# Patient Record
Sex: MALE | Race: WHITE | NOT HISPANIC OR LATINO | URBAN - METROPOLITAN AREA
[De-identification: names, ages, dates, MRNs, and addresses within clinical notes are randomized per-mention and may not be internally consistent; named-entity substitution may affect disease eponyms.]

---

## 2018-08-16 ENCOUNTER — OUTPATIENT (OUTPATIENT)
Dept: OUTPATIENT SERVICES | Facility: HOSPITAL | Age: 78
LOS: 1 days | Discharge: HOME | End: 2018-08-16

## 2018-08-16 LAB — GLUCOSE BLDC GLUCOMTR-MCNC: 75 MG/DL — SIGNIFICANT CHANGE UP (ref 70–99)

## 2019-07-30 PROBLEM — Z00.00 ENCOUNTER FOR PREVENTIVE HEALTH EXAMINATION: Status: ACTIVE | Noted: 2019-07-30

## 2019-08-02 ENCOUNTER — APPOINTMENT (OUTPATIENT)
Dept: OTOLARYNGOLOGY | Facility: CLINIC | Age: 79
End: 2019-08-02
Payer: MEDICARE

## 2019-08-02 VITALS
HEART RATE: 73 BPM | HEIGHT: 63 IN | DIASTOLIC BLOOD PRESSURE: 83 MMHG | BODY MASS INDEX: 28 KG/M2 | SYSTOLIC BLOOD PRESSURE: 145 MMHG | WEIGHT: 158 LBS

## 2019-08-02 DIAGNOSIS — Z86.39 PERSONAL HISTORY OF OTHER ENDOCRINE, NUTRITIONAL AND METABOLIC DISEASE: ICD-10-CM

## 2019-08-02 DIAGNOSIS — Z78.9 OTHER SPECIFIED HEALTH STATUS: ICD-10-CM

## 2019-08-02 DIAGNOSIS — Z87.891 PERSONAL HISTORY OF NICOTINE DEPENDENCE: ICD-10-CM

## 2019-08-02 PROCEDURE — 99204 OFFICE O/P NEW MOD 45 MIN: CPT | Mod: 25

## 2019-08-02 PROCEDURE — 31237 NSL/SINS NDSC SURG BX POLYPC: CPT

## 2019-08-05 PROBLEM — Z86.39 HISTORY OF DIABETES MELLITUS: Status: RESOLVED | Noted: 2019-08-02 | Resolved: 2019-08-05

## 2019-08-05 PROBLEM — Z86.39 HISTORY OF HIGH CHOLESTEROL: Status: RESOLVED | Noted: 2019-08-02 | Resolved: 2019-08-05

## 2019-08-05 PROBLEM — Z87.891 FORMER SMOKER: Status: ACTIVE | Noted: 2019-08-02

## 2019-08-05 PROBLEM — Z78.9 NO PERTINENT PAST MEDICAL HISTORY: Status: RESOLVED | Noted: 2019-08-02 | Resolved: 2019-08-05

## 2019-08-05 PROBLEM — Z78.9 CONSUMES ALCOHOL OCCASIONALLY: Status: ACTIVE | Noted: 2019-08-02

## 2019-08-05 NOTE — HISTORY OF PRESENT ILLNESS
[de-identified] : 78M here for initial evaluation.\par \par Over the past few weeks, he c/o worsening nasal congestion/obstruction with poor sleep quality. However, his sx have started to improve, but he is here for evaluation.\par There is no headache or clear rhinorrhea, no facial pressure or pain.\par He otherwise feels well today.\par \par He underwent endoscopic resection of pituitary tumor in 2014 in [place], but does not know anything further regarding that. \par \par ROS otherwise unremarkable.

## 2019-08-05 NOTE — PROCEDURE
[FreeTextEntry3] : Nasal Endoscopy:\par abundant thick green crusting, removed\par very large/subtotal septal perforation, areas of scarring of remant septum to right MT; scarring of left MT to lateral wall\par b/l sphenoid osti patent, sinus patent, no drainage

## 2019-08-05 NOTE — CONSULT LETTER
[Dear  ___] : Dear  [unfilled], [Courtesy Letter:] : I had the pleasure of seeing your patient, [unfilled], in my office today. [Consult Closing:] : Thank you very much for allowing me to participate in the care of this patient.  If you have any questions, please do not hesitate to contact me. [Sincerely,] : Sincerely, [FreeTextEntry3] : Cain Elise MD\par Department of Otolaryngology - Head and Neck Surgery\par [facility]

## 2019-08-05 NOTE — ASSESSMENT
[FreeTextEntry1] : 78M here for initial evaluation. Over the past few weeks, he c/o worsening nasal congestion/obstruction with poor sleep quality. However, his sx have started to improve, but he is here for evaluation either way to touch base. There is no headache or clear rhinorrhea, no facial pressure or pain. He otherwise feels well today.\par He underwent endoscopic resection of a pituitary tumor in 2014 in [place], but does not know anything further regarding that.\par On exam, nasal endoscopy shows abundant thick green crusting, which was removed. 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 He is otherwise asymptomatic today, but I do not know much about his prior surgery. Will start w getting CT sinus as next step. He will likely also need MRI to assess the central skull base as well. to start daily neilmed rinses for nasal hygiene. RTO after CT.

## 2019-08-12 ENCOUNTER — OUTPATIENT (OUTPATIENT)
Dept: OUTPATIENT SERVICES | Facility: HOSPITAL | Age: 79
LOS: 1 days | End: 2019-08-12

## 2019-08-12 ENCOUNTER — APPOINTMENT (OUTPATIENT)
Dept: CT IMAGING | Facility: CLINIC | Age: 79
End: 2019-08-12
Payer: MEDICARE

## 2019-08-12 PROCEDURE — 70486 CT MAXILLOFACIAL W/O DYE: CPT | Mod: 26

## 2019-09-03 ENCOUNTER — APPOINTMENT (OUTPATIENT)
Dept: OTOLARYNGOLOGY | Facility: CLINIC | Age: 79
End: 2019-09-03
Payer: MEDICARE

## 2019-09-03 VITALS
BODY MASS INDEX: 28.53 KG/M2 | HEART RATE: 77 BPM | HEIGHT: 63 IN | WEIGHT: 161 LBS | SYSTOLIC BLOOD PRESSURE: 130 MMHG | DIASTOLIC BLOOD PRESSURE: 80 MMHG

## 2019-09-03 PROCEDURE — 31231 NASAL ENDOSCOPY DX: CPT

## 2019-09-03 PROCEDURE — 99213 OFFICE O/P EST LOW 20 MIN: CPT | Mod: 25

## 2019-09-03 NOTE — DATA REVIEWED
[No studies available for review at this time] : No studies available for review at this time [de-identified] : CT Sinus 8/12/19:\par Findings: \par \par * Prior Surgery: There is large perforation of the nasal septum, 3.1 cm in \par anterior posterior length. The left middle turbinate vertical short is \par absent which may be postsurgical, however the uncinate processes remain in \par situ without evidence of antrostomy. There may be minimal ethmoidectomy on \par the left. Defect of the left medial orbital wall is felt to be posttraumatic. \par \par * Sinuses: The right sphenoid sinus is partially opacified with \par heterogeneous content with small foci of internal ossification. There is \par hyperostosis of the walls of the right sphenoid sinus, inclusive of the \par sellar floor, that implies chronicity. The soft tissue is immediately \par adjacent to a defect in the anterior wall of the right sphenoid sinus \par (series 3, image 49; series 604, image 39), also in close proximity to the \par posterior septal insertion. There is somewhat strand-like configuration of \par ossification of the floor of the sphenoid sinus (coronal image 52) somewhat \par reminiscent of inverted papilloma. The left sphenoid sinus and both sphenoid \par ostia are patent. \par \par * Scant mucosal thickening is seen in scattered ethmoid air cells and in \par the maxillary sinuses without fluid level. \par \par * Sinocranial and Sino-orbital Junctions: Left medial orbital wall defect \par with fat herniating into the ethmoid air cells. The orbital floors appear \par intact. The central skull base appears intact as well with preserved bony \par carotid and optic canals. \par \par * Ostiomeatal Units: Patent, with somewhat horizontal configuration of the \par right uncinate process from low-lying ethmoid air cells. \par \par * Frontal Sinus Outflow Tracts: Patent although crowded in the presence of \par variant frontoethmoidal air cells. \par \par * Sphenoethmoidal Recesses: No masslike soft tissue or other asymmetry \par \par * Nasal Cavity/Nasopharynx: No discrete soft tissue mass. Large nasal \par septal perforation, as above, with some residual septum bowing to the right \par and contacting the right middle turbinate. \par \par * Orbits: No abnormal soft tissue or mass effect. The ocular lenses have \par been replaced \par \par * Brain: Limited assessment of the intracranial compartment shows \par age-appropriate mild parenchymal volume loss and periventricular white \par matter small vessel ischemic lucency. No hydrocephalus or mass effect. \par \par * Mastoids: Well aerated. \par \par \par IMPRESSION: \par \par Large nasal septal perforation, likely stigmata of granulomatous disease or \par toxic exposure, with additional defect in the left middle turbinate strut \par that may be postoperative. Both maxillary outflows are patent with the \par uncinates in situ. \par \par Opacified right sphenoid sinus with partially-calcified content. There is \par adjacent defect in the anterior wall of the right sphenoid sinus for which \par further evaluation is recommended. While this is likely chronic and \par inflammatory (possibly fungal) in nature, further evaluation with direct \par inspection and contrast-enhanced MRI of the face is suggested to better \par exclude a mass, i.e. inverted papilloma. \par \par Remote left medial orbital wall fracture with ethmoid herniation of orbital \par fat. \par

## 2019-09-03 NOTE — PROCEDURE
[FreeTextEntry3] : Nasal Endoscopy:\par very large/subtotal septal perforation, areas of scarring of remant septum to right MT; scarring of left MT to lateral wall\par b/l sphenoid osti patent, but very contracted, cannot see into either sinus, no drainage\par no tumor or mass seen

## 2019-09-03 NOTE — HISTORY OF PRESENT ILLNESS
[de-identified] : 78M here in followup.\par \par There are no new issues since last seen. He is here to review CT results.\par \par Initially seen for worsening nasal congestion/obstruction with poor sleep quality. 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 They do not have the records with them.\par \par CT Sinus 8/12/19:\par -Large nasal septal perforation, with additional defect in the left middle turbinate strut.\par -Opacified right sphenoid sinus with partially-calcified content. \par \par ROS otherwise unremarkable.

## 2019-09-03 NOTE — ASSESSMENT
[FreeTextEntry1] : 78M here in followup. There are no new issues since last seen 6 weeks ago and he is here to review CT results.\par Initially seen for worsening nasal congestion/obstruction with poor sleep quality, which have improved. There is no headache or clear rhinorrhea, no facial pressure or pain. 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 There is a very large septal perforation with areas of scarring to surrounding tissues; both sphenoid ostia are patent without drainage but contracted and I cannot see into either sinus.\par He has large septal perforation and extensive postsurgical changes after some form of aggressive endonasal surgery 5 years ago. I cannot even determine if this was pituitary surgery given the appearance of the sella on CT. I do not know much about his prior surgery and do not have records.\par Will proceed with MRI to assess the central skull base. They will get me his old records. Continue daily neilmed rinses. RTO 1 month to review and formulate treatment plan.

## 2019-09-03 NOTE — CONSULT LETTER
[Dear  ___] : Dear  [unfilled], [Consult Closing:] : Thank you very much for allowing me to participate in the care of this patient.  If you have any questions, please do not hesitate to contact me. [Courtesy Letter:] : I had the pleasure of seeing your patient, [unfilled], in my office today. [Sincerely,] : Sincerely, [FreeTextEntry3] : Cain Elise MD\par Department of Otolaryngology - Head and Neck Surgery\par [facility]

## 2019-09-24 ENCOUNTER — FORM ENCOUNTER (OUTPATIENT)
Age: 79
End: 2019-09-24

## 2019-09-25 ENCOUNTER — OUTPATIENT (OUTPATIENT)
Dept: OUTPATIENT SERVICES | Facility: HOSPITAL | Age: 79
LOS: 1 days | End: 2019-09-25
Payer: MEDICARE

## 2019-09-25 ENCOUNTER — APPOINTMENT (OUTPATIENT)
Dept: MRI IMAGING | Facility: HOSPITAL | Age: 79
End: 2019-09-25
Payer: MEDICARE

## 2019-09-25 PROCEDURE — 70553 MRI BRAIN STEM W/O & W/DYE: CPT | Mod: 26

## 2019-09-25 PROCEDURE — 70553 MRI BRAIN STEM W/O & W/DYE: CPT

## 2019-09-25 PROCEDURE — A9585: CPT

## 2019-10-31 ENCOUNTER — APPOINTMENT (OUTPATIENT)
Dept: OTOLARYNGOLOGY | Facility: CLINIC | Age: 79
End: 2019-10-31
Payer: MEDICARE

## 2019-10-31 VITALS
BODY MASS INDEX: 26.2 KG/M2 | WEIGHT: 163 LBS | HEART RATE: 66 BPM | HEIGHT: 66 IN | DIASTOLIC BLOOD PRESSURE: 84 MMHG | SYSTOLIC BLOOD PRESSURE: 169 MMHG

## 2019-10-31 PROCEDURE — 99213 OFFICE O/P EST LOW 20 MIN: CPT | Mod: 25

## 2019-10-31 PROCEDURE — 31231 NASAL ENDOSCOPY DX: CPT

## 2019-10-31 NOTE — CONSULT LETTER
[Dear  ___] : Dear  [unfilled], [Courtesy Letter:] : I had the pleasure of seeing your patient, [unfilled], in my office today. [Consult Closing:] : Thank you very much for allowing me to participate in the care of this patient.  If you have any questions, please do not hesitate to contact me. [Sincerely,] : Sincerely, [FreeTextEntry3] : Cain Elise MD\par Department of Otolaryngology - Head and Neck Surgery\par [facility]

## 2019-10-31 NOTE — ASSESSMENT
[FreeTextEntry1] : 78M here in followup. There are no new issues since last seen 6 weeks ago and he is here to review imaging.\par Initially seen for worsening nasal congestion/obstruction with poor sleep quality, which have all resolved. There is no headache or clear rhinorrhea, no facial pressure or pain. 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 MRI shows relative fullness of the left pituitary lobe with a paucity of right adenohypophysial tissue.\par On exam, nasal endoscopy is improved and there is no further thick green crusting or edema. There is a very large septal perforation with areas of scarring to surrounding tissues; both sphenoid ostia are patent without drainage but contracted and I cannot see into either sinus.\par He has large septal perforation and extensive postsurgical changes after some form of aggressive endonasal surgery 5 years ago. I do not know much about his prior surgery and do not have records. However, based on the most recent MRI, as detailed above, there is likely nothing further to be done, especially since he feels well at this time and prior sx have resolved. I again reiterated to them to get me his old records. Continue daily neilmed rinses. RTO with old records.

## 2019-10-31 NOTE — DATA REVIEWED
[No studies available for review at this time] : No studies available for review at this time [de-identified] : CT Sinus 8/12/19:\par Findings: \par \par * Prior Surgery: There is large perforation of the nasal septum, 3.1 cm in \par anterior posterior length. The left middle turbinate vertical short is \par absent which may be postsurgical, however the uncinate processes remain in \par situ without evidence of antrostomy. There may be minimal ethmoidectomy on \par the left. Defect of the left medial orbital wall is felt to be posttraumatic. \par \par * Sinuses: The right sphenoid sinus is partially opacified with \par heterogeneous content with small foci of internal ossification. There is \par hyperostosis of the walls of the right sphenoid sinus, inclusive of the \par sellar floor, that implies chronicity. The soft tissue is immediately \par adjacent to a defect in the anterior wall of the right sphenoid sinus \par (series 3, image 49; series 604, image 39), also in close proximity to the \par posterior septal insertion. There is somewhat strand-like configuration of \par ossification of the floor of the sphenoid sinus (coronal image 52) somewhat \par reminiscent of inverted papilloma. The left sphenoid sinus and both sphenoid \par ostia are patent. \par \par * Scant mucosal thickening is seen in scattered ethmoid air cells and in \par the maxillary sinuses without fluid level. \par \par * Sinocranial and Sino-orbital Junctions: Left medial orbital wall defect \par with fat herniating into the ethmoid air cells. The orbital floors appear \par intact. The central skull base appears intact as well with preserved bony \par carotid and optic canals. \par \par * Ostiomeatal Units: Patent, with somewhat horizontal configuration of the \par right uncinate process from low-lying ethmoid air cells. \par \par * Frontal Sinus Outflow Tracts: Patent although crowded in the presence of \par variant frontoethmoidal air cells. \par \par * Sphenoethmoidal Recesses: No masslike soft tissue or other asymmetry \par \par * Nasal Cavity/Nasopharynx: No discrete soft tissue mass. Large nasal \par septal perforation, as above, with some residual septum bowing to the right \par and contacting the right middle turbinate. \par \par * Orbits: No abnormal soft tissue or mass effect. The ocular lenses have \par been replaced \par \par * Brain: Limited assessment of the intracranial compartment shows \par age-appropriate mild parenchymal volume loss and periventricular white \par matter small vessel ischemic lucency. No hydrocephalus or mass effect. \par \par * Mastoids: Well aerated. \par \par \par IMPRESSION: \par \par Large nasal septal perforation, likely stigmata of granulomatous disease or \par toxic exposure, with additional defect in the left middle turbinate strut \par that may be postoperative. Both maxillary outflows are patent with the \par uncinates in situ. \par \par Opacified right sphenoid sinus with partially-calcified content. There is \par adjacent defect in the anterior wall of the right sphenoid sinus for which \par further evaluation is recommended. While this is likely chronic and \par inflammatory (possibly fungal) in nature, further evaluation with direct \par inspection and contrast-enhanced MRI of the face is suggested to better \par exclude a mass, i.e. inverted papilloma. \par \par Remote left medial orbital wall fracture with ethmoid herniation of orbital \par fat. \par \par There is evidence of prior transseptal transsphenoidal surgery with right \par sphenoid sinus opacification, as described in report of the 8/2019 sinus CT. 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 This finding is well seen on precontrast sagittal T1 series 8 image \par 8. There is an asymmetric configuration to the adenohypophysis with relative \par fullness and diminished enhancement within the left lobe. A demarcated \par lesion measuring 8 mm in maximal dimension is most evident on enhanced \par sagittal T1 image 11 (series 11) as well as on coronal enhanced T1 image 14 \par (series 12). There is, however, a paucity of pituitary tissue in the \par expected location of the right lobe and it is possible that this accounts \par for the appearance of relative left lobe fullness. Ultimately, correlation \par with prior imaging studies and biochemical parameters would be most helpful \par in assessing for potential recurrence. \par \par The optic chiasm and cavernous sinuses are normal in appearance bilaterally. \par There is a relative medial swing to the internal carotid arteries, more so \par on the left side. Limited evaluation of the intracranial contents \par demonstrates mild ventriculomegaly, likely reflecting central cerebral \par volume loss, as well as numerous foci of T2 prolongation in the cerebral \par white matter that most commonly reflect changes of chronic ischemic \par microangiopathy in a patient of this age. \par \par IMPRESSION: \par \par While there is relative fullness of the left pituitary lobe, there is a \par paucity of right adenohypophysial tissue, and significance of this \par appearance is uncertain. Comparison with prior imaging studies would be most \par helpful in assessing for potential recurrence of pituitary adenoma. \par

## 2019-10-31 NOTE — HISTORY OF PRESENT ILLNESS
[de-identified] : 78M here in followup.\par \par There are no new issues since last seen. 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 They do not have the records with them.\par \par CT Sinus 8/12/19:\par -Large nasal septal perforation, with additional defect in the left middle turbinate strut.\par -Opacified right sphenoid sinus with partially-calcified content. \par \par MRI Face 9/25/19:\par While there is relative fullness of the left pituitary lobe, there is a paucity of right adenohypophysial tissue, and significance of this appearance is uncertain. Comparison with prior imaging studies would be most helpful in assessing for potential recurrence of pituitary adenoma. \par \par ROS otherwise unremarkable.

## 2019-10-31 NOTE — PROCEDURE
[FreeTextEntry3] : Nasal Endoscopy:\par very large/subtotal septal perforation, areas of scarring of remnant septum to right MT; scarring of left MT to lateral wall\par b/l sphenoid osti patent, but very contracted, cannot see into either sinus, no drainage\par no tumor or mass seen

## 2021-01-01 ENCOUNTER — APPOINTMENT (OUTPATIENT)
Dept: OTOLARYNGOLOGY | Facility: CLINIC | Age: 81
End: 2021-01-01
Payer: MEDICARE

## 2021-01-01 VITALS
TEMPERATURE: 98.3 F | SYSTOLIC BLOOD PRESSURE: 103 MMHG | HEART RATE: 87 BPM | DIASTOLIC BLOOD PRESSURE: 85 MMHG | WEIGHT: 170 LBS | HEIGHT: 66 IN | BODY MASS INDEX: 27.32 KG/M2

## 2021-01-01 DIAGNOSIS — K11.20 SIALOADENITIS, UNSPECIFIED: ICD-10-CM

## 2021-01-01 PROCEDURE — 99213 OFFICE O/P EST LOW 20 MIN: CPT

## 2021-02-16 ENCOUNTER — APPOINTMENT (OUTPATIENT)
Dept: OTOLARYNGOLOGY | Facility: CLINIC | Age: 81
End: 2021-02-16
Payer: MEDICARE

## 2021-02-16 PROCEDURE — 31237 NSL/SINS NDSC SURG BX POLYPC: CPT | Mod: 50

## 2021-02-16 PROCEDURE — 99072 ADDL SUPL MATRL&STAF TM PHE: CPT

## 2021-02-16 PROCEDURE — 99213 OFFICE O/P EST LOW 20 MIN: CPT | Mod: 25

## 2021-02-16 NOTE — DATA REVIEWED
[No studies available for review at this time] : No studies available for review at this time [de-identified] : CT Sinus 8/12/19:\par Findings: \par \par * Prior Surgery: There is large perforation of the nasal septum, 3.1 cm in \par anterior posterior length. The left middle turbinate vertical short is \par absent which may be postsurgical, however the uncinate processes remain in \par situ without evidence of antrostomy. There may be minimal ethmoidectomy on \par the left. Defect of the left medial orbital wall is felt to be posttraumatic. \par \par * Sinuses: The right sphenoid sinus is partially opacified with \par heterogeneous content with small foci of internal ossification. There is \par hyperostosis of the walls of the right sphenoid sinus, inclusive of the \par sellar floor, that implies chronicity. The soft tissue is immediately \par adjacent to a defect in the anterior wall of the right sphenoid sinus \par (series 3, image 49; series 604, image 39), also in close proximity to the \par posterior septal insertion. There is somewhat strand-like configuration of \par ossification of the floor of the sphenoid sinus (coronal image 52) somewhat \par reminiscent of inverted papilloma. The left sphenoid sinus and both sphenoid \par ostia are patent. \par \par * Scant mucosal thickening is seen in scattered ethmoid air cells and in \par the maxillary sinuses without fluid level. \par \par * Sinocranial and Sino-orbital Junctions: Left medial orbital wall defect \par with fat herniating into the ethmoid air cells. The orbital floors appear \par intact. The central skull base appears intact as well with preserved bony \par carotid and optic canals. \par \par * Ostiomeatal Units: Patent, with somewhat horizontal configuration of the \par right uncinate process from low-lying ethmoid air cells. \par \par * Frontal Sinus Outflow Tracts: Patent although crowded in the presence of \par variant frontoethmoidal air cells. \par \par * Sphenoethmoidal Recesses: No masslike soft tissue or other asymmetry \par \par * Nasal Cavity/Nasopharynx: No discrete soft tissue mass. Large nasal \par septal perforation, as above, with some residual septum bowing to the right \par and contacting the right middle turbinate. \par \par * Orbits: No abnormal soft tissue or mass effect. The ocular lenses have \par been replaced \par \par * Brain: Limited assessment of the intracranial compartment shows \par age-appropriate mild parenchymal volume loss and periventricular white \par matter small vessel ischemic lucency. No hydrocephalus or mass effect. \par \par * Mastoids: Well aerated. \par \par \par IMPRESSION: \par \par Large nasal septal perforation, likely stigmata of granulomatous disease or \par toxic exposure, with additional defect in the left middle turbinate strut \par that may be postoperative. Both maxillary outflows are patent with the \par uncinates in situ. \par \par Opacified right sphenoid sinus with partially-calcified content. There is \par adjacent defect in the anterior wall of the right sphenoid sinus for which \par further evaluation is recommended. While this is likely chronic and \par inflammatory (possibly fungal) in nature, further evaluation with direct \par inspection and contrast-enhanced MRI of the face is suggested to better \par exclude a mass, i.e. inverted papilloma. \par \par Remote left medial orbital wall fracture with ethmoid herniation of orbital \par fat. \par \par There is evidence of prior transseptal transsphenoidal surgery with right \par sphenoid sinus opacification, as described in report of the 8/2019 sinus CT. 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 This finding is well seen on precontrast sagittal T1 series 8 image \par 8. There is an asymmetric configuration to the adenohypophysis with relative \par fullness and diminished enhancement within the left lobe. A demarcated \par lesion measuring 8 mm in maximal dimension is most evident on enhanced \par sagittal T1 image 11 (series 11) as well as on coronal enhanced T1 image 14 \par (series 12). There is, however, a paucity of pituitary tissue in the \par expected location of the right lobe and it is possible that this accounts \par for the appearance of relative left lobe fullness. Ultimately, correlation \par with prior imaging studies and biochemical parameters would be most helpful \par in assessing for potential recurrence. \par \par The optic chiasm and cavernous sinuses are normal in appearance bilaterally. \par There is a relative medial swing to the internal carotid arteries, more so \par on the left side. Limited evaluation of the intracranial contents \par demonstrates mild ventriculomegaly, likely reflecting central cerebral \par volume loss, as well as numerous foci of T2 prolongation in the cerebral \par white matter that most commonly reflect changes of chronic ischemic \par microangiopathy in a patient of this age. \par \par IMPRESSION: \par \par While there is relative fullness of the left pituitary lobe, there is a \par paucity of right adenohypophysial tissue, and significance of this \par appearance is uncertain. Comparison with prior imaging studies would be most \par helpful in assessing for potential recurrence of pituitary adenoma. \par

## 2021-02-16 NOTE — ASSESSMENT
[FreeTextEntry1] : 80M here in followup. He was last seen 10/2019. He c/o nasal obstruction and congestion and poor sleeping. He has not been using any nasal irrigations. There is no headache or clear rhinorrhea, no facial pressure or pain. 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 CT imaging here in 2019 shows a large nasal septal perforation and an opacified right sphenoid sinus with partially-calcified content and diffusely irregular, thickened and osteitic sphenoid walls; MRI shows relative fullness of the left pituitary lobe with a paucity of right adenohypophysial tissue.\par On exam, nasal endoscopy shows copious amounts of crusting and debris w complete nasal airway obstruction; this was debrided w improvement in sx. There is a very large septal perforation with areas of scarring to surrounding tissues; both sphenoid ostia are patent without drainage but contracted.\par He has large septal perforation and extensive postsurgical changes after some form of aggressive endonasal surgery 7 years ago. I do not know much about his prior surgery and do not have records. Given the nature of surgery, he needs to be on strict nasal regimen and hygiene given the fact he will buildup crusting. Based on the most recent MRI, as detailed above, there is likely nothing further to be done surgically. Continue daily neilmed rinses. RTO in 6 months w with old records.

## 2021-02-16 NOTE — PROCEDURE
[FreeTextEntry3] : Nasal Endoscopy:\par severe amount of crusting and mucoid debris debrided w suction and forceps\par very large/subtotal septal perforation, areas of scarring of remnant septum to right MT; scarring of left MT to lateral wall\par b/l sphenoid osti patent, but very contracted, cannot see into either sinus\par no tumor or mass seen

## 2021-02-16 NOTE — HISTORY OF PRESENT ILLNESS
[de-identified] : 80M here in followup.\par \par He was last seen 10/2019.\par He c/o nasal obstruction and congestion and poor sleeping. He has not been using any nasal irrigations. 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 They do not have the records with them.\par \par CT Sinus 8/12/19:\par -Large nasal septal perforation, with additional defect in the left middle turbinate strut.\par -Opacified right sphenoid sinus with partially-calcified content. \par \par MRI Face 9/25/19:\par While there is relative fullness of the left pituitary lobe, there is a paucity of right adenohypophysial tissue, and significance of this appearance is uncertain. Comparison with prior imaging studies would be most helpful in assessing for potential recurrence of pituitary adenoma. \par \par ROS otherwise unremarkable.

## 2021-02-16 NOTE — CONSULT LETTER
[Dear  ___] : Dear  [unfilled], [Courtesy Letter:] : I had the pleasure of seeing your patient, [unfilled], in my office today. [Consult Closing:] : Thank you very much for allowing me to participate in the care of this patient.  If you have any questions, please do not hesitate to contact me. [Sincerely,] : Sincerely, [FreeTextEntry3] : Cain Elise MD\par Department of Otolaryngology - Head and Neck Surgery\par [facility]

## 2021-08-17 ENCOUNTER — APPOINTMENT (OUTPATIENT)
Dept: OTOLARYNGOLOGY | Facility: CLINIC | Age: 81
End: 2021-08-17
Payer: MEDICARE

## 2021-08-17 VITALS
WEIGHT: 163 LBS | HEIGHT: 66 IN | BODY MASS INDEX: 26.2 KG/M2 | SYSTOLIC BLOOD PRESSURE: 149 MMHG | DIASTOLIC BLOOD PRESSURE: 80 MMHG | TEMPERATURE: 97.1 F | HEART RATE: 71 BPM

## 2021-08-17 PROCEDURE — 31231 NASAL ENDOSCOPY DX: CPT

## 2021-08-17 PROCEDURE — 99213 OFFICE O/P EST LOW 20 MIN: CPT | Mod: 25

## 2021-08-17 NOTE — PROCEDURE
[FreeTextEntry3] : Nasal Endoscopy:\par nasal airways widely patent\par no crusting or mucoid debris\par large septal perforation, areas of scarring of remnant septum to right MT; scarring of left MT to lateral wall\par b/l sphenoid osti patent, but very contracted, cannot see into either sinus\par no tumor or mass seen

## 2021-08-17 NOTE — HISTORY OF PRESENT ILLNESS
[de-identified] : 80M here in followup.\par \par He was last seen 2/2021.\par Since then, he doing very well. 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 They do not have the records with them.\par \par CT Sinus 8/12/19:\par -Large nasal septal perforation, with additional defect in the left middle turbinate strut.\par -Opacified right sphenoid sinus with partially-calcified content. \par \par MRI Face 9/25/19:\par While there is relative fullness of the left pituitary lobe, there is a paucity of right adenohypophysial tissue, and significance of this appearance is uncertain. Comparison with prior imaging studies would be most helpful in assessing for potential recurrence of pituitary adenoma. \par \par ROS otherwise unremarkable.

## 2021-08-17 NOTE — CONSULT LETTER
[Dear  ___] : Dear  [unfilled], [Courtesy Letter:] : I had the pleasure of seeing your patient, [unfilled], in my office today. [Consult Closing:] : Thank you very much for allowing me to participate in the care of this patient.  If you have any questions, please do not hesitate to contact me. [Sincerely,] : Sincerely, [FreeTextEntry3] : Cain Elise MD\par Department of Otolaryngology - Head and Neck Surgery\par [facility]

## 2021-08-17 NOTE — DATA REVIEWED
[No studies available for review at this time] : No studies available for review at this time [de-identified] : CT Sinus 8/12/19:\par Findings: \par \par * Prior Surgery: There is large perforation of the nasal septum, 3.1 cm in \par anterior posterior length. The left middle turbinate vertical short is \par absent which may be postsurgical, however the uncinate processes remain in \par situ without evidence of antrostomy. There may be minimal ethmoidectomy on \par the left. Defect of the left medial orbital wall is felt to be posttraumatic. \par \par * Sinuses: The right sphenoid sinus is partially opacified with \par heterogeneous content with small foci of internal ossification. There is \par hyperostosis of the walls of the right sphenoid sinus, inclusive of the \par sellar floor, that implies chronicity. The soft tissue is immediately \par adjacent to a defect in the anterior wall of the right sphenoid sinus \par (series 3, image 49; series 604, image 39), also in close proximity to the \par posterior septal insertion. There is somewhat strand-like configuration of \par ossification of the floor of the sphenoid sinus (coronal image 52) somewhat \par reminiscent of inverted papilloma. The left sphenoid sinus and both sphenoid \par ostia are patent. \par \par * Scant mucosal thickening is seen in scattered ethmoid air cells and in \par the maxillary sinuses without fluid level. \par \par * Sinocranial and Sino-orbital Junctions: Left medial orbital wall defect \par with fat herniating into the ethmoid air cells. The orbital floors appear \par intact. The central skull base appears intact as well with preserved bony \par carotid and optic canals. \par \par * Ostiomeatal Units: Patent, with somewhat horizontal configuration of the \par right uncinate process from low-lying ethmoid air cells. \par \par * Frontal Sinus Outflow Tracts: Patent although crowded in the presence of \par variant frontoethmoidal air cells. \par \par * Sphenoethmoidal Recesses: No masslike soft tissue or other asymmetry \par \par * Nasal Cavity/Nasopharynx: No discrete soft tissue mass. Large nasal \par septal perforation, as above, with some residual septum bowing to the right \par and contacting the right middle turbinate. \par \par * Orbits: No abnormal soft tissue or mass effect. The ocular lenses have \par been replaced \par \par * Brain: Limited assessment of the intracranial compartment shows \par age-appropriate mild parenchymal volume loss and periventricular white \par matter small vessel ischemic lucency. No hydrocephalus or mass effect. \par \par * Mastoids: Well aerated. \par \par \par IMPRESSION: \par \par Large nasal septal perforation, likely stigmata of granulomatous disease or \par toxic exposure, with additional defect in the left middle turbinate strut \par that may be postoperative. Both maxillary outflows are patent with the \par uncinates in situ. \par \par Opacified right sphenoid sinus with partially-calcified content. There is \par adjacent defect in the anterior wall of the right sphenoid sinus for which \par further evaluation is recommended. While this is likely chronic and \par inflammatory (possibly fungal) in nature, further evaluation with direct \par inspection and contrast-enhanced MRI of the face is suggested to better \par exclude a mass, i.e. inverted papilloma. \par \par Remote left medial orbital wall fracture with ethmoid herniation of orbital \par fat. \par \par There is evidence of prior transseptal transsphenoidal surgery with right \par sphenoid sinus opacification, as described in report of the 8/2019 sinus CT. 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 This finding is well seen on precontrast sagittal T1 series 8 image \par 8. There is an asymmetric configuration to the adenohypophysis with relative \par fullness and diminished enhancement within the left lobe. A demarcated \par lesion measuring 8 mm in maximal dimension is most evident on enhanced \par sagittal T1 image 11 (series 11) as well as on coronal enhanced T1 image 14 \par (series 12). There is, however, a paucity of pituitary tissue in the \par expected location of the right lobe and it is possible that this accounts \par for the appearance of relative left lobe fullness. Ultimately, correlation \par with prior imaging studies and biochemical parameters would be most helpful \par in assessing for potential recurrence. \par \par The optic chiasm and cavernous sinuses are normal in appearance bilaterally. \par There is a relative medial swing to the internal carotid arteries, more so \par on the left side. Limited evaluation of the intracranial contents \par demonstrates mild ventriculomegaly, likely reflecting central cerebral \par volume loss, as well as numerous foci of T2 prolongation in the cerebral \par white matter that most commonly reflect changes of chronic ischemic \par microangiopathy in a patient of this age. \par \par IMPRESSION: \par \par While there is relative fullness of the left pituitary lobe, there is a \par paucity of right adenohypophysial tissue, and significance of this \par appearance is uncertain. Comparison with prior imaging studies would be most \par helpful in assessing for potential recurrence of pituitary adenoma. \par

## 2021-08-17 NOTE — ASSESSMENT
[FreeTextEntry1] : 80M here in followup. He was last seen 2/2021. Since then, he doing very well. He is using the saline rinses and there is no further nasal obstruction/congestion and  sleep quality is good. 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 I do not know much about his prior surgery and do not have records. Given the nature of surgery, he needs to be on strict nasal regimen and hygiene given the fact he will buildup crusting, which he is doing which is why he doing much better. Based on the most recent MRI, as detailed above, there is likely nothing further to be done surgically. Continue daily neilmed rinses. RTO in 12 months w with old records.

## 2021-11-02 ENCOUNTER — APPOINTMENT (OUTPATIENT)
Dept: OTOLARYNGOLOGY | Facility: CLINIC | Age: 81
End: 2021-11-02
Payer: MEDICARE

## 2021-11-02 VITALS
DIASTOLIC BLOOD PRESSURE: 120 MMHG | SYSTOLIC BLOOD PRESSURE: 200 MMHG | WEIGHT: 163 LBS | BODY MASS INDEX: 26.2 KG/M2 | TEMPERATURE: 98.7 F | HEIGHT: 66 IN

## 2021-11-02 PROCEDURE — 31231 NASAL ENDOSCOPY DX: CPT

## 2021-11-02 PROCEDURE — 99213 OFFICE O/P EST LOW 20 MIN: CPT | Mod: 25

## 2021-11-02 RX ORDER — AMOXICILLIN AND CLAVULANATE POTASSIUM 875; 125 MG/1; MG/1
875-125 TABLET, COATED ORAL
Qty: 20 | Refills: 0 | Status: ACTIVE | COMMUNITY
Start: 2021-11-02 | End: 1900-01-01

## 2021-11-02 NOTE — HISTORY OF PRESENT ILLNESS
[de-identified] : 80M here in followup.\par \par Over the past 2-3 weeks, he c/o left ear pain and swelling in front of his left ear. 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 They do not have the records with them.\par \par CT Sinus 8/12/19:\par -Large nasal septal perforation, with additional defect in the left middle turbinate strut.\par -Opacified right sphenoid sinus with partially-calcified content. \par \par MRI Face 9/25/19:\par While there is relative fullness of the left pituitary lobe, there is a paucity of right adenohypophysial tissue, and significance of this appearance is uncertain. Comparison with prior imaging studies would be most helpful in assessing for potential recurrence of pituitary adenoma. \par \par ROS otherwise unremarkable.

## 2021-11-02 NOTE — PROCEDURE
[FreeTextEntry3] : Nasal Endoscopy:\par nasal airways widely patent\par scant crusting or mucoid debris\par large septal perforation, areas of scarring of remnant septum to right MT; scarring of left MT to lateral wall\par b/l sphenoid osti patent, but very contracted, cannot see into either sinus\par no tumor or mass seen

## 2021-11-02 NOTE — CONSULT LETTER
[Dear  ___] : Dear  [unfilled], [Courtesy Letter:] : I had the pleasure of seeing your patient, [unfilled], in my office today. [Consult Closing:] : Thank you very much for allowing me to participate in the care of this patient.  If you have any questions, please do not hesitate to contact me. [Sincerely,] : Sincerely, [FreeTextEntry3] : Cain Elise MD\par Department of Otolaryngology - Head and Neck Surgery\par [facility]

## 2021-11-02 NOTE — DATA REVIEWED
[No studies available for review at this time] : No studies available for review at this time [de-identified] : CT Sinus 8/12/19:\par Findings: \par \par * Prior Surgery: There is large perforation of the nasal septum, 3.1 cm in \par anterior posterior length. The left middle turbinate vertical short is \par absent which may be postsurgical, however the uncinate processes remain in \par situ without evidence of antrostomy. There may be minimal ethmoidectomy on \par the left. Defect of the left medial orbital wall is felt to be posttraumatic. \par \par * Sinuses: The right sphenoid sinus is partially opacified with \par heterogeneous content with small foci of internal ossification. There is \par hyperostosis of the walls of the right sphenoid sinus, inclusive of the \par sellar floor, that implies chronicity. The soft tissue is immediately \par adjacent to a defect in the anterior wall of the right sphenoid sinus \par (series 3, image 49; series 604, image 39), also in close proximity to the \par posterior septal insertion. There is somewhat strand-like configuration of \par ossification of the floor of the sphenoid sinus (coronal image 52) somewhat \par reminiscent of inverted papilloma. The left sphenoid sinus and both sphenoid \par ostia are patent. \par \par * Scant mucosal thickening is seen in scattered ethmoid air cells and in \par the maxillary sinuses without fluid level. \par \par * Sinocranial and Sino-orbital Junctions: Left medial orbital wall defect \par with fat herniating into the ethmoid air cells. The orbital floors appear \par intact. The central skull base appears intact as well with preserved bony \par carotid and optic canals. \par \par * Ostiomeatal Units: Patent, with somewhat horizontal configuration of the \par right uncinate process from low-lying ethmoid air cells. \par \par * Frontal Sinus Outflow Tracts: Patent although crowded in the presence of \par variant frontoethmoidal air cells. \par \par * Sphenoethmoidal Recesses: No masslike soft tissue or other asymmetry \par \par * Nasal Cavity/Nasopharynx: No discrete soft tissue mass. Large nasal \par septal perforation, as above, with some residual septum bowing to the right \par and contacting the right middle turbinate. \par \par * Orbits: No abnormal soft tissue or mass effect. The ocular lenses have \par been replaced \par \par * Brain: Limited assessment of the intracranial compartment shows \par age-appropriate mild parenchymal volume loss and periventricular white \par matter small vessel ischemic lucency. No hydrocephalus or mass effect. \par \par * Mastoids: Well aerated. \par \par \par IMPRESSION: \par \par Large nasal septal perforation, likely stigmata of granulomatous disease or \par toxic exposure, with additional defect in the left middle turbinate strut \par that may be postoperative. Both maxillary outflows are patent with the \par uncinates in situ. \par \par Opacified right sphenoid sinus with partially-calcified content. There is \par adjacent defect in the anterior wall of the right sphenoid sinus for which \par further evaluation is recommended. While this is likely chronic and \par inflammatory (possibly fungal) in nature, further evaluation with direct \par inspection and contrast-enhanced MRI of the face is suggested to better \par exclude a mass, i.e. inverted papilloma. \par \par Remote left medial orbital wall fracture with ethmoid herniation of orbital \par fat. \par \par There is evidence of prior transseptal transsphenoidal surgery with right \par sphenoid sinus opacification, as described in report of the 8/2019 sinus CT. 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 This finding is well seen on precontrast sagittal T1 series 8 image \par 8. There is an asymmetric configuration to the adenohypophysis with relative \par fullness and diminished enhancement within the left lobe. A demarcated \par lesion measuring 8 mm in maximal dimension is most evident on enhanced \par sagittal T1 image 11 (series 11) as well as on coronal enhanced T1 image 14 \par (series 12). There is, however, a paucity of pituitary tissue in the \par expected location of the right lobe and it is possible that this accounts \par for the appearance of relative left lobe fullness. Ultimately, correlation \par with prior imaging studies and biochemical parameters would be most helpful \par in assessing for potential recurrence. \par \par The optic chiasm and cavernous sinuses are normal in appearance bilaterally. \par There is a relative medial swing to the internal carotid arteries, more so \par on the left side. Limited evaluation of the intracranial contents \par demonstrates mild ventriculomegaly, likely reflecting central cerebral \par volume loss, as well as numerous foci of T2 prolongation in the cerebral \par white matter that most commonly reflect changes of chronic ischemic \par microangiopathy in a patient of this age. \par \par IMPRESSION: \par \par While there is relative fullness of the left pituitary lobe, there is a \par paucity of right adenohypophysial tissue, and significance of this \par appearance is uncertain. Comparison with prior imaging studies would be most \par helpful in assessing for potential recurrence of pituitary adenoma. \par

## 2021-11-02 NOTE — ASSESSMENT
[FreeTextEntry1] : 80M here in followup. Over the past 2-3 weeks, he c/o left ear pain and swelling in front of his left ear. Sx have persisted during this time and he feels have gotten worse. He is using the saline rinses and there is no further nasal obstruction/congestion. There is no headache or clear rhinorrhea.\par On exam, there is pain/tenderness on palpation over the left pretragal parotid region w mild fullness but no discrete mass. The left EAC is normal. Nasal endoscopy remains improved with no further crusting or mucoid debris; there is a very large septal perforation with areas of scarring to surrounding tissues; both sphenoid ostia are patent without drainage but contracted and there is no tumor or mass/lesion.\par I think sx due to left acute parotitis. Will give augmentin, sialogogues, hydration. RTO 3 weeks to ensure improvement. If sx persist/worsen, will image.\par From sinonasal standpoint, sx improved s/p extensive postsurgical changes after some form of aggressive endonasal surgery 7-8 years ago. I do not know much about his prior surgery and do not have records. Given the nature of surgery, he needs to be on strict nasal regimen and hygiene given the fact he will buildup crusting, which he is doing which is why he doing much better.

## 2021-11-02 NOTE — PHYSICAL EXAM
[de-identified] : pain/tenderness on palpation over left parotid w mild fullness but no discrete mass [Nasal Endoscopy Performed] : nasal endoscopy was performed, see procedure section for findings [Midline] : trachea located in midline position [de-identified] : parotid ducts patent [Normal] : no rashes

## 2021-11-23 PROBLEM — K11.20 PAROTITIS: Status: ACTIVE | Noted: 2021-11-02

## 2021-11-23 NOTE — CONSULT LETTER
[Dear  ___] : Dear  [unfilled], [Courtesy Letter:] : I had the pleasure of seeing your patient, [unfilled], in my office today. [Consult Closing:] : Thank you very much for allowing me to participate in the care of this patient.  If you have any questions, please do not hesitate to contact me. [Sincerely,] : Sincerely, [FreeTextEntry3] : Cain Elise MD\par Department of Otolaryngology - Head and Neck Surgery\par [facility]

## 2021-11-23 NOTE — ASSESSMENT
[FreeTextEntry1] : 80M here in followup. Since last seen 3 weeks ago, he finished course of abx and prior sx of left sided ear pain and left facial swelling have improved. He has no acute complaints today. He is using the saline rinses and there is no further nasal obstruction/congestion. There is no headache or clear rhinorrhea.\par On exam, both parotids are soft and flat as is the neck w no masses/lesions. Nasal endoscopy from prior visit 3 weeks ago remains improved with no further crusting or mucoid debris; there is a very large septal perforation with areas of scarring to surrounding tissues; both sphenoid ostia are patent without drainage but contracted and there is no tumor or mass/lesion.\par Prior sx likely due to an acute parotitis and it is now resolved. Continue plenty of hydration!\par From sinonasal standpoint, sx improved s/p extensive postsurgical changes after some form of aggressive endonasal surgery 7-8 years ago. I do not know much about his prior surgery and do not have records. Given the nature of surgery, he needs to be on strict nasal regimen and hygiene given the fact he will buildup crusting, which he is doing which is why he doing much better. RTO 6 months for debridement.

## 2021-11-23 NOTE — PROCEDURE
[FreeTextEntry3] : Nasal Endoscopy (from prior):\par nasal airways widely patent\par scant crusting or mucoid debris\par large septal perforation, areas of scarring of remnant septum to right MT; scarring of left MT to lateral wall\par b/l sphenoid osti patent, but very contracted, cannot see into either sinus\par no tumor or mass seen

## 2021-11-23 NOTE — DATA REVIEWED
[No studies available for review at this time] : No studies available for review at this time [de-identified] : CT Sinus 8/12/19:\par Findings: \par \par * Prior Surgery: There is large perforation of the nasal septum, 3.1 cm in \par anterior posterior length. The left middle turbinate vertical short is \par absent which may be postsurgical, however the uncinate processes remain in \par situ without evidence of antrostomy. There may be minimal ethmoidectomy on \par the left. Defect of the left medial orbital wall is felt to be posttraumatic. \par \par * Sinuses: The right sphenoid sinus is partially opacified with \par heterogeneous content with small foci of internal ossification. There is \par hyperostosis of the walls of the right sphenoid sinus, inclusive of the \par sellar floor, that implies chronicity. The soft tissue is immediately \par adjacent to a defect in the anterior wall of the right sphenoid sinus \par (series 3, image 49; series 604, image 39), also in close proximity to the \par posterior septal insertion. There is somewhat strand-like configuration of \par ossification of the floor of the sphenoid sinus (coronal image 52) somewhat \par reminiscent of inverted papilloma. The left sphenoid sinus and both sphenoid \par ostia are patent. \par \par * Scant mucosal thickening is seen in scattered ethmoid air cells and in \par the maxillary sinuses without fluid level. \par \par * Sinocranial and Sino-orbital Junctions: Left medial orbital wall defect \par with fat herniating into the ethmoid air cells. The orbital floors appear \par intact. The central skull base appears intact as well with preserved bony \par carotid and optic canals. \par \par * Ostiomeatal Units: Patent, with somewhat horizontal configuration of the \par right uncinate process from low-lying ethmoid air cells. \par \par * Frontal Sinus Outflow Tracts: Patent although crowded in the presence of \par variant frontoethmoidal air cells. \par \par * Sphenoethmoidal Recesses: No masslike soft tissue or other asymmetry \par \par * Nasal Cavity/Nasopharynx: No discrete soft tissue mass. Large nasal \par septal perforation, as above, with some residual septum bowing to the right \par and contacting the right middle turbinate. \par \par * Orbits: No abnormal soft tissue or mass effect. The ocular lenses have \par been replaced \par \par * Brain: Limited assessment of the intracranial compartment shows \par age-appropriate mild parenchymal volume loss and periventricular white \par matter small vessel ischemic lucency. No hydrocephalus or mass effect. \par \par * Mastoids: Well aerated. \par \par \par IMPRESSION: \par \par Large nasal septal perforation, likely stigmata of granulomatous disease or \par toxic exposure, with additional defect in the left middle turbinate strut \par that may be postoperative. Both maxillary outflows are patent with the \par uncinates in situ. \par \par Opacified right sphenoid sinus with partially-calcified content. There is \par adjacent defect in the anterior wall of the right sphenoid sinus for which \par further evaluation is recommended. While this is likely chronic and \par inflammatory (possibly fungal) in nature, further evaluation with direct \par inspection and contrast-enhanced MRI of the face is suggested to better \par exclude a mass, i.e. inverted papilloma. \par \par Remote left medial orbital wall fracture with ethmoid herniation of orbital \par fat. \par \par There is evidence of prior transseptal transsphenoidal surgery with right \par sphenoid sinus opacification, as described in report of the 8/2019 sinus CT. 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 This finding is well seen on precontrast sagittal T1 series 8 image \par 8. There is an asymmetric configuration to the adenohypophysis with relative \par fullness and diminished enhancement within the left lobe. A demarcated \par lesion measuring 8 mm in maximal dimension is most evident on enhanced \par sagittal T1 image 11 (series 11) as well as on coronal enhanced T1 image 14 \par (series 12). There is, however, a paucity of pituitary tissue in the \par expected location of the right lobe and it is possible that this accounts \par for the appearance of relative left lobe fullness. Ultimately, correlation \par with prior imaging studies and biochemical parameters would be most helpful \par in assessing for potential recurrence. \par \par The optic chiasm and cavernous sinuses are normal in appearance bilaterally. \par There is a relative medial swing to the internal carotid arteries, more so \par on the left side. Limited evaluation of the intracranial contents \par demonstrates mild ventriculomegaly, likely reflecting central cerebral \par volume loss, as well as numerous foci of T2 prolongation in the cerebral \par white matter that most commonly reflect changes of chronic ischemic \par microangiopathy in a patient of this age. \par \par IMPRESSION: \par \par While there is relative fullness of the left pituitary lobe, there is a \par paucity of right adenohypophysial tissue, and significance of this \par appearance is uncertain. Comparison with prior imaging studies would be most \par helpful in assessing for potential recurrence of pituitary adenoma. \par

## 2021-11-23 NOTE — HISTORY OF PRESENT ILLNESS
[de-identified] : 80M here in followup.\par \par Since last seen 3 weeks ago, he finished course of abx and prior sx of left sided ear pain and left facial swelling have improved. 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 They do not have the records with them.\par \par CT Sinus 8/12/19:\par -Large nasal septal perforation, with additional defect in the left middle turbinate strut.\par -Opacified right sphenoid sinus with partially-calcified content. \par \par MRI Face 9/25/19:\par While there is relative fullness of the left pituitary lobe, there is a paucity of right adenohypophysial tissue, and significance of this appearance is uncertain. Comparison with prior imaging studies would be most helpful in assessing for potential recurrence of pituitary adenoma. \par \par ROS otherwise unremarkable.

## 2021-11-23 NOTE — PHYSICAL EXAM
[Nasal Endoscopy Performed] : nasal endoscopy was performed, see procedure section for findings [Midline] : trachea located in midline position [Normal] : parotid gland, submandibular gland and thyroid glands are normal [de-identified] : no masses/lesions [de-identified] : parotid symmetric, soft and flat, nontender. no masses/lesions [de-identified] : parotid ducts patent

## 2022-01-01 ENCOUNTER — APPOINTMENT (OUTPATIENT)
Dept: OTOLARYNGOLOGY | Facility: CLINIC | Age: 82
End: 2022-01-01

## 2022-01-01 ENCOUNTER — APPOINTMENT (OUTPATIENT)
Dept: OTOLARYNGOLOGY | Facility: CLINIC | Age: 82
End: 2022-01-01
Payer: MEDICARE

## 2022-01-01 VITALS
DIASTOLIC BLOOD PRESSURE: 75 MMHG | TEMPERATURE: 96.5 F | HEIGHT: 66 IN | HEART RATE: 65 BPM | BODY MASS INDEX: 27.32 KG/M2 | SYSTOLIC BLOOD PRESSURE: 167 MMHG | WEIGHT: 170 LBS

## 2022-01-01 VITALS
BODY MASS INDEX: 27.32 KG/M2 | WEIGHT: 170 LBS | DIASTOLIC BLOOD PRESSURE: 88 MMHG | HEART RATE: 87 BPM | SYSTOLIC BLOOD PRESSURE: 143 MMHG | HEIGHT: 66 IN | TEMPERATURE: 97.6 F

## 2022-01-01 DIAGNOSIS — J34.89 OTHER SPECIFIED DISORDERS OF NOSE AND NASAL SINUSES: ICD-10-CM

## 2022-01-01 PROCEDURE — 31231 NASAL ENDOSCOPY DX: CPT

## 2022-01-01 PROCEDURE — 99213 OFFICE O/P EST LOW 20 MIN: CPT | Mod: 25

## 2022-02-22 NOTE — ASSESSMENT
[FreeTextEntry1] : 81M here in followup. Since last seen 3 months ago, he is doing well without complaints. Prior to last visit he finished course of abx for left parotitis in the setting of left sided ear pain and left facial swelling. He is using the saline rinses and there is no further nasal obstruction/congestion. There is no headache or clear rhinorrhea.\par On exam, both parotids are soft and flat as is the neck w no masses/lesions. There may be very mild subtle fullness over the left anterosuperior parotid area. Nasal endoscopy remains improved with no further crusting or mucoid debris; there is a very large septal perforation with areas of scarring to surrounding tissues; both sphenoid ostia are patent without drainage but contracted and there is no tumor or mass/lesion.\par Prior sx likely due to an acute parotitis and remains resolved. Continue plenty of hydration!\par From sinonasal standpoint, exam unremarkable and unchanged. I do not know much about his prior surgery and do not have records, but given the nature of surgery, he needs to be on strict nasal regimen and hygiene. RTO 6 months for debridement.

## 2022-02-22 NOTE — DATA REVIEWED
[No studies available for review at this time] : No studies available for review at this time [de-identified] : CT Sinus 8/12/19:\par Findings: \par \par * Prior Surgery: There is large perforation of the nasal septum, 3.1 cm in \par anterior posterior length. The left middle turbinate vertical short is \par absent which may be postsurgical, however the uncinate processes remain in \par situ without evidence of antrostomy. There may be minimal ethmoidectomy on \par the left. Defect of the left medial orbital wall is felt to be posttraumatic. \par \par * Sinuses: The right sphenoid sinus is partially opacified with \par heterogeneous content with small foci of internal ossification. There is \par hyperostosis of the walls of the right sphenoid sinus, inclusive of the \par sellar floor, that implies chronicity. The soft tissue is immediately \par adjacent to a defect in the anterior wall of the right sphenoid sinus \par (series 3, image 49; series 604, image 39), also in close proximity to the \par posterior septal insertion. There is somewhat strand-like configuration of \par ossification of the floor of the sphenoid sinus (coronal image 52) somewhat \par reminiscent of inverted papilloma. The left sphenoid sinus and both sphenoid \par ostia are patent. \par \par * Scant mucosal thickening is seen in scattered ethmoid air cells and in \par the maxillary sinuses without fluid level. \par \par * Sinocranial and Sino-orbital Junctions: Left medial orbital wall defect \par with fat herniating into the ethmoid air cells. The orbital floors appear \par intact. The central skull base appears intact as well with preserved bony \par carotid and optic canals. \par \par * Ostiomeatal Units: Patent, with somewhat horizontal configuration of the \par right uncinate process from low-lying ethmoid air cells. \par \par * Frontal Sinus Outflow Tracts: Patent although crowded in the presence of \par variant frontoethmoidal air cells. \par \par * Sphenoethmoidal Recesses: No masslike soft tissue or other asymmetry \par \par * Nasal Cavity/Nasopharynx: No discrete soft tissue mass. Large nasal \par septal perforation, as above, with some residual septum bowing to the right \par and contacting the right middle turbinate. \par \par * Orbits: No abnormal soft tissue or mass effect. The ocular lenses have \par been replaced \par \par * Brain: Limited assessment of the intracranial compartment shows \par age-appropriate mild parenchymal volume loss and periventricular white \par matter small vessel ischemic lucency. No hydrocephalus or mass effect. \par \par * Mastoids: Well aerated. \par \par \par IMPRESSION: \par \par Large nasal septal perforation, likely stigmata of granulomatous disease or \par toxic exposure, with additional defect in the left middle turbinate strut \par that may be postoperative. Both maxillary outflows are patent with the \par uncinates in situ. \par \par Opacified right sphenoid sinus with partially-calcified content. There is \par adjacent defect in the anterior wall of the right sphenoid sinus for which \par further evaluation is recommended. While this is likely chronic and \par inflammatory (possibly fungal) in nature, further evaluation with direct \par inspection and contrast-enhanced MRI of the face is suggested to better \par exclude a mass, i.e. inverted papilloma. \par \par Remote left medial orbital wall fracture with ethmoid herniation of orbital \par fat. \par \par There is evidence of prior transseptal transsphenoidal surgery with right \par sphenoid sinus opacification, as described in report of the 8/2019 sinus CT. 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 This finding is well seen on precontrast sagittal T1 series 8 image \par 8. There is an asymmetric configuration to the adenohypophysis with relative \par fullness and diminished enhancement within the left lobe. A demarcated \par lesion measuring 8 mm in maximal dimension is most evident on enhanced \par sagittal T1 image 11 (series 11) as well as on coronal enhanced T1 image 14 \par (series 12). There is, however, a paucity of pituitary tissue in the \par expected location of the right lobe and it is possible that this accounts \par for the appearance of relative left lobe fullness. Ultimately, correlation \par with prior imaging studies and biochemical parameters would be most helpful \par in assessing for potential recurrence. \par \par The optic chiasm and cavernous sinuses are normal in appearance bilaterally. \par There is a relative medial swing to the internal carotid arteries, more so \par on the left side. Limited evaluation of the intracranial contents \par demonstrates mild ventriculomegaly, likely reflecting central cerebral \par volume loss, as well as numerous foci of T2 prolongation in the cerebral \par white matter that most commonly reflect changes of chronic ischemic \par microangiopathy in a patient of this age. \par \par IMPRESSION: \par \par While there is relative fullness of the left pituitary lobe, there is a \par paucity of right adenohypophysial tissue, and significance of this \par appearance is uncertain. Comparison with prior imaging studies would be most \par helpful in assessing for potential recurrence of pituitary adenoma. \par

## 2022-02-22 NOTE — HISTORY OF PRESENT ILLNESS
[de-identified] : 81M here in followup.\par \par Since last seen 3 months ago, he is doing well without complaints. 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 They do not have the records with them.\par \par CT Sinus 8/12/19:\par -Large nasal septal perforation, with additional defect in the left middle turbinate strut.\par -Opacified right sphenoid sinus with partially-calcified content. \par \par MRI Face 9/25/19:\par While there is relative fullness of the left pituitary lobe, there is a paucity of right adenohypophysial tissue, and significance of this appearance is uncertain. Comparison with prior imaging studies would be most helpful in assessing for potential recurrence of pituitary adenoma. \par \par ROS otherwise unremarkable.

## 2022-02-22 NOTE — CONSULT LETTER
[Dear  ___] : Dear  [unfilled], [Courtesy Letter:] : I had the pleasure of seeing your patient, [unfilled], in my office today. [Consult Closing:] : Thank you very much for allowing me to participate in the care of this patient.  If you have any questions, please do not hesitate to contact me. [Sincerely,] : Sincerely, [FreeTextEntry3] : Cain Elise MD\par Department of Otolaryngology - Head and Neck Surgery\par [facility]

## 2022-05-17 PROBLEM — J34.89 NASAL CRUSTING: Status: ACTIVE | Noted: 2019-08-05

## 2022-05-17 PROBLEM — J34.89 NASAL OBSTRUCTION: Status: ACTIVE | Noted: 2019-08-05

## 2022-05-17 NOTE — HISTORY OF PRESENT ILLNESS
[de-identified] : 81M here in followup.\par \par Since last seen 3 months ago, he is doing very well without complaints. 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 They do not have the records with them.\par \par CT Sinus 8/12/19:\par -Large nasal septal perforation, with additional defect in the left middle turbinate strut.\par -Opacified right sphenoid sinus with partially-calcified content. \par \par MRI Face 9/25/19:\par While there is relative fullness of the left pituitary lobe, there is a paucity of right adenohypophysial tissue, and significance of this appearance is uncertain. Comparison with prior imaging studies would be most helpful in assessing for potential recurrence of pituitary adenoma. \par \par ROS otherwise unremarkable.

## 2022-05-17 NOTE — ASSESSMENT
[FreeTextEntry1] : 81M here in followup. Since last seen 3 months ago, he is doing very well without complaints. Prior to visit 11/2021 he finished course of abx for left parotitis in the setting of left sided ear pain and left facial swelling; that remains resolved. He is using the saline rinses and there is no further nasal obstruction/congestion. There is no headache or clear rhinorrhea.\par On exam, both parotids are soft and flat as is the neck w no masses/lesions. Nasal endoscopy remains improved with no further crusting or mucoid debris; there is a very large septal perforation with areas of scarring to surrounding tissues; both sphenoid ostia are patent without drainage but contracted and there is no tumor or mass/lesion.\par Prior sx likely due to an acute parotitis and remains resolved. Continue plenty of hydration!\par From sinonasal standpoint, exam unremarkable and unchanged. I do not know much about his prior surgery and do not have records, but given the nature of surgery, he needs to be on strict nasal regimen and hygiene. RTO 6 months for debridement.

## 2022-05-17 NOTE — DATA REVIEWED
[No studies available for review at this time] : No studies available for review at this time [de-identified] : CT Sinus 8/12/19:\par Findings: \par \par * Prior Surgery: There is large perforation of the nasal septum, 3.1 cm in \par anterior posterior length. The left middle turbinate vertical short is \par absent which may be postsurgical, however the uncinate processes remain in \par situ without evidence of antrostomy. There may be minimal ethmoidectomy on \par the left. Defect of the left medial orbital wall is felt to be posttraumatic. \par \par * Sinuses: The right sphenoid sinus is partially opacified with \par heterogeneous content with small foci of internal ossification. There is \par hyperostosis of the walls of the right sphenoid sinus, inclusive of the \par sellar floor, that implies chronicity. The soft tissue is immediately \par adjacent to a defect in the anterior wall of the right sphenoid sinus \par (series 3, image 49; series 604, image 39), also in close proximity to the \par posterior septal insertion. There is somewhat strand-like configuration of \par ossification of the floor of the sphenoid sinus (coronal image 52) somewhat \par reminiscent of inverted papilloma. The left sphenoid sinus and both sphenoid \par ostia are patent. \par \par * Scant mucosal thickening is seen in scattered ethmoid air cells and in \par the maxillary sinuses without fluid level. \par \par * Sinocranial and Sino-orbital Junctions: Left medial orbital wall defect \par with fat herniating into the ethmoid air cells. The orbital floors appear \par intact. The central skull base appears intact as well with preserved bony \par carotid and optic canals. \par \par * Ostiomeatal Units: Patent, with somewhat horizontal configuration of the \par right uncinate process from low-lying ethmoid air cells. \par \par * Frontal Sinus Outflow Tracts: Patent although crowded in the presence of \par variant frontoethmoidal air cells. \par \par * Sphenoethmoidal Recesses: No masslike soft tissue or other asymmetry \par \par * Nasal Cavity/Nasopharynx: No discrete soft tissue mass. Large nasal \par septal perforation, as above, with some residual septum bowing to the right \par and contacting the right middle turbinate. \par \par * Orbits: No abnormal soft tissue or mass effect. The ocular lenses have \par been replaced \par \par * Brain: Limited assessment of the intracranial compartment shows \par age-appropriate mild parenchymal volume loss and periventricular white \par matter small vessel ischemic lucency. No hydrocephalus or mass effect. \par \par * Mastoids: Well aerated. \par \par \par IMPRESSION: \par \par Large nasal septal perforation, likely stigmata of granulomatous disease or \par toxic exposure, with additional defect in the left middle turbinate strut \par that may be postoperative. Both maxillary outflows are patent with the \par uncinates in situ. \par \par Opacified right sphenoid sinus with partially-calcified content. There is \par adjacent defect in the anterior wall of the right sphenoid sinus for which \par further evaluation is recommended. While this is likely chronic and \par inflammatory (possibly fungal) in nature, further evaluation with direct \par inspection and contrast-enhanced MRI of the face is suggested to better \par exclude a mass, i.e. inverted papilloma. \par \par Remote left medial orbital wall fracture with ethmoid herniation of orbital \par fat. \par \par There is evidence of prior transseptal transsphenoidal surgery with right \par sphenoid sinus opacification, as described in report of the 8/2019 sinus CT. 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 This finding is well seen on precontrast sagittal T1 series 8 image \par 8. There is an asymmetric configuration to the adenohypophysis with relative \par fullness and diminished enhancement within the left lobe. A demarcated \par lesion measuring 8 mm in maximal dimension is most evident on enhanced \par sagittal T1 image 11 (series 11) as well as on coronal enhanced T1 image 14 \par (series 12). There is, however, a paucity of pituitary tissue in the \par expected location of the right lobe and it is possible that this accounts \par for the appearance of relative left lobe fullness. Ultimately, correlation \par with prior imaging studies and biochemical parameters would be most helpful \par in assessing for potential recurrence. \par \par The optic chiasm and cavernous sinuses are normal in appearance bilaterally. \par There is a relative medial swing to the internal carotid arteries, more so \par on the left side. Limited evaluation of the intracranial contents \par demonstrates mild ventriculomegaly, likely reflecting central cerebral \par volume loss, as well as numerous foci of T2 prolongation in the cerebral \par white matter that most commonly reflect changes of chronic ischemic \par microangiopathy in a patient of this age. \par \par IMPRESSION: \par \par While there is relative fullness of the left pituitary lobe, there is a \par paucity of right adenohypophysial tissue, and significance of this \par appearance is uncertain. Comparison with prior imaging studies would be most \par helpful in assessing for potential recurrence of pituitary adenoma. \par

## 2022-05-17 NOTE — CONSULT LETTER
[Dear  ___] : Dear  [unfilled], [Courtesy Letter:] : I had the pleasure of seeing your patient, [unfilled], in my office today. [Consult Closing:] : Thank you very much for allowing me to participate in the care of this patient.  If you have any questions, please do not hesitate to contact me. [Sincerely,] : Sincerely, [FreeTextEntry3] : Cain Elise MD\par Department of Otolaryngology - Head and Neck Surgery\par [facility]

## 2022-05-17 NOTE — PHYSICAL EXAM
[de-identified] : no masses/lesions [de-identified] : parotid mostly symmetric, soft and flat, nontender. no masses/lesions. maybe subtle fullness anterosuperiorly? [Nasal Endoscopy Performed] : nasal endoscopy was performed, see procedure section for findings [Midline] : trachea located in midline position [de-identified] : parotid ducts patent [Normal] : no rashes [FreeTextEntry2] : face flat, no mass/lesion in head/neck

## 2022-11-17 ENCOUNTER — APPOINTMENT (OUTPATIENT)
Dept: OTOLARYNGOLOGY | Facility: CLINIC | Age: 82
End: 2022-11-17

## 2025-02-04 NOTE — PHYSICAL EXAM
[Nasal Endoscopy Performed] : nasal endoscopy was performed, see procedure section for findings [Midline] : trachea located in midline position [Normal] : no rashes [de-identified] : no masses/lesions [de-identified] : parotid mostly symmetric, soft and flat, nontender. no masses/lesions. maybe subtle fullness anterosuperiorly? [de-identified] : parotid ducts patent no